# Patient Record
Sex: FEMALE
[De-identification: names, ages, dates, MRNs, and addresses within clinical notes are randomized per-mention and may not be internally consistent; named-entity substitution may affect disease eponyms.]

---

## 2020-11-11 ENCOUNTER — APPOINTMENT (OUTPATIENT)
Dept: INTERNAL MEDICINE | Facility: CLINIC | Age: 40
End: 2020-11-11

## 2022-05-09 ENCOUNTER — NON-APPOINTMENT (OUTPATIENT)
Age: 42
End: 2022-05-09

## 2022-05-09 ENCOUNTER — APPOINTMENT (OUTPATIENT)
Dept: INTERNAL MEDICINE | Facility: CLINIC | Age: 42
End: 2022-05-09
Payer: COMMERCIAL

## 2022-05-09 VITALS
HEIGHT: 69 IN | OXYGEN SATURATION: 100 % | BODY MASS INDEX: 35.84 KG/M2 | WEIGHT: 242 LBS | TEMPERATURE: 98.3 F | RESPIRATION RATE: 18 BRPM | HEART RATE: 102 BPM

## 2022-05-09 DIAGNOSIS — Z00.00 ENCOUNTER FOR GENERAL ADULT MEDICAL EXAMINATION W/OUT ABNORMAL FINDINGS: ICD-10-CM

## 2022-05-09 DIAGNOSIS — K64.9 UNSPECIFIED HEMORRHOIDS: ICD-10-CM

## 2022-05-09 DIAGNOSIS — Z78.9 OTHER SPECIFIED HEALTH STATUS: ICD-10-CM

## 2022-05-09 DIAGNOSIS — Z82.49 FAMILY HISTORY OF ISCHEMIC HEART DISEASE AND OTHER DISEASES OF THE CIRCULATORY SYSTEM: ICD-10-CM

## 2022-05-09 PROCEDURE — 99204 OFFICE O/P NEW MOD 45 MIN: CPT

## 2022-05-09 RX ORDER — HYDROCORTISONE ACETATE 25 MG/1
25 SUPPOSITORY RECTAL
Qty: 5 | Refills: 0 | Status: ACTIVE | COMMUNITY
Start: 2022-05-09 | End: 1900-01-01

## 2022-06-09 ENCOUNTER — APPOINTMENT (OUTPATIENT)
Dept: INTERNAL MEDICINE | Facility: CLINIC | Age: 42
End: 2022-06-09
Payer: COMMERCIAL

## 2022-06-09 PROCEDURE — 99214 OFFICE O/P EST MOD 30 MIN: CPT | Mod: 95

## 2022-06-09 RX ORDER — LOSARTAN POTASSIUM 50 MG/1
50 TABLET, FILM COATED ORAL
Qty: 90 | Refills: 3 | Status: DISCONTINUED | COMMUNITY
Start: 2022-05-09 | End: 2022-06-09

## 2022-11-02 ENCOUNTER — RESULT REVIEW (OUTPATIENT)
Age: 42
End: 2022-11-02

## 2023-01-26 ENCOUNTER — APPOINTMENT (OUTPATIENT)
Dept: INTERNAL MEDICINE | Facility: CLINIC | Age: 43
End: 2023-01-26

## 2023-02-13 ENCOUNTER — APPOINTMENT (OUTPATIENT)
Dept: INTERNAL MEDICINE | Facility: CLINIC | Age: 43
End: 2023-02-13
Payer: COMMERCIAL

## 2023-02-13 VITALS
SYSTOLIC BLOOD PRESSURE: 135 MMHG | DIASTOLIC BLOOD PRESSURE: 87 MMHG | HEIGHT: 69 IN | HEART RATE: 68 BPM | BODY MASS INDEX: 38.21 KG/M2 | TEMPERATURE: 97.3 F | OXYGEN SATURATION: 100 % | RESPIRATION RATE: 18 BRPM | WEIGHT: 258 LBS

## 2023-02-13 DIAGNOSIS — Z01.818 ENCOUNTER FOR OTHER PREPROCEDURAL EXAMINATION: ICD-10-CM

## 2023-02-13 PROCEDURE — 99214 OFFICE O/P EST MOD 30 MIN: CPT

## 2023-07-11 ENCOUNTER — APPOINTMENT (OUTPATIENT)
Dept: INTERNAL MEDICINE | Facility: CLINIC | Age: 43
End: 2023-07-11
Payer: COMMERCIAL

## 2023-07-11 ENCOUNTER — NON-APPOINTMENT (OUTPATIENT)
Age: 43
End: 2023-07-11

## 2023-07-11 VITALS
SYSTOLIC BLOOD PRESSURE: 132 MMHG | TEMPERATURE: 97.6 F | DIASTOLIC BLOOD PRESSURE: 88 MMHG | RESPIRATION RATE: 17 BRPM | HEIGHT: 69 IN | HEART RATE: 78 BPM | OXYGEN SATURATION: 99 % | BODY MASS INDEX: 39.25 KG/M2 | WEIGHT: 265 LBS

## 2023-07-11 DIAGNOSIS — Z00.00 ENCOUNTER FOR GENERAL ADULT MEDICAL EXAMINATION W/OUT ABNORMAL FINDINGS: ICD-10-CM

## 2023-07-11 PROCEDURE — 99396 PREV VISIT EST AGE 40-64: CPT

## 2023-07-11 RX ORDER — LOSARTAN POTASSIUM AND HYDROCHLOROTHIAZIDE 12.5; 1 MG/1; MG/1
100-12.5 TABLET ORAL DAILY
Qty: 90 | Refills: 3 | Status: ACTIVE | COMMUNITY
Start: 2022-06-09 | End: 1900-01-01

## 2023-07-11 RX ORDER — CLOBETASOL PROPIONATE 0.5 MG/G
0.05 CREAM TOPICAL TWICE DAILY
Qty: 1 | Refills: 0 | Status: ACTIVE | COMMUNITY
Start: 2023-07-11 | End: 1900-01-01

## 2023-07-11 RX ORDER — ESCITALOPRAM OXALATE 10 MG/1
10 TABLET ORAL
Qty: 90 | Refills: 3 | Status: ACTIVE | COMMUNITY
Start: 2022-05-09 | End: 1900-01-01

## 2023-07-12 LAB
25(OH)D3 SERPL-MCNC: 32.6 NG/ML
ALBUMIN SERPL ELPH-MCNC: 4.9 G/DL
ALP BLD-CCNC: 69 U/L
ALT SERPL-CCNC: 19 U/L
ANION GAP SERPL CALC-SCNC: 12 MMOL/L
AST SERPL-CCNC: 20 U/L
BILIRUB SERPL-MCNC: 0.5 MG/DL
BUN SERPL-MCNC: 13 MG/DL
CALCIUM SERPL-MCNC: 10 MG/DL
CHLORIDE SERPL-SCNC: 101 MMOL/L
CHOLEST SERPL-MCNC: 218 MG/DL
CO2 SERPL-SCNC: 26 MMOL/L
CREAT SERPL-MCNC: 0.79 MG/DL
EGFR: 96 ML/MIN/1.73M2
ESTIMATED AVERAGE GLUCOSE: 117 MG/DL
GLUCOSE SERPL-MCNC: 87 MG/DL
HBA1C MFR BLD HPLC: 5.7 %
HDLC SERPL-MCNC: 60 MG/DL
LDLC SERPL CALC-MCNC: 137 MG/DL
NONHDLC SERPL-MCNC: 158 MG/DL
POTASSIUM SERPL-SCNC: 4 MMOL/L
PROT SERPL-MCNC: 7.7 G/DL
SODIUM SERPL-SCNC: 139 MMOL/L
TRIGL SERPL-MCNC: 121 MG/DL
TSH SERPL-ACNC: 0.63 UIU/ML

## 2023-07-14 ENCOUNTER — TRANSCRIPTION ENCOUNTER (OUTPATIENT)
Age: 43
End: 2023-07-14

## 2023-08-16 ENCOUNTER — APPOINTMENT (OUTPATIENT)
Dept: BARIATRICS/WEIGHT MGMT | Facility: CLINIC | Age: 43
End: 2023-08-16
Payer: COMMERCIAL

## 2023-08-16 VITALS
HEART RATE: 82 BPM | OXYGEN SATURATION: 99 % | BODY MASS INDEX: 39.7 KG/M2 | WEIGHT: 268.06 LBS | SYSTOLIC BLOOD PRESSURE: 129 MMHG | TEMPERATURE: 97.7 F | DIASTOLIC BLOOD PRESSURE: 84 MMHG | HEIGHT: 68.9 IN

## 2023-08-16 DIAGNOSIS — L30.9 DERMATITIS, UNSPECIFIED: ICD-10-CM

## 2023-08-16 DIAGNOSIS — F41.9 ANXIETY DISORDER, UNSPECIFIED: ICD-10-CM

## 2023-08-16 DIAGNOSIS — I10 ESSENTIAL (PRIMARY) HYPERTENSION: ICD-10-CM

## 2023-08-16 DIAGNOSIS — R73.03 PREDIABETES.: ICD-10-CM

## 2023-08-16 DIAGNOSIS — F32.A ANXIETY DISORDER, UNSPECIFIED: ICD-10-CM

## 2023-08-16 DIAGNOSIS — E66.9 OBESITY, UNSPECIFIED: ICD-10-CM

## 2023-08-16 PROCEDURE — G0447 BEHAVIOR COUNSEL OBESITY 15M: CPT | Mod: 59

## 2023-08-16 PROCEDURE — 99205 OFFICE O/P NEW HI 60 MIN: CPT

## 2023-08-16 NOTE — HISTORY OF PRESENT ILLNESS
[Improved Health] : Improved health [Improved Looks/Aesthetics] : Improved looks/aesthetics [Teens] : teens [Cut/Track Calories] : cut/track calories [Low Carb Diet (Atkins/Keto)] : low carb diet (Atkins/Keto) [Other: ____] : [unfilled] [9] : 9 [I usually sleep 4-6 hours] : I usually sleep 4-6 hours [I snore] : I snore [2+ miles] : Walking distance capability: 2+ miles [0] : 0 [None] : none [FreeTextEntry2] : 258 [FreeTextEntry3] : 270 [] : No [FreeTextEntry1] : 43 year old female with pre-dm, HTN, anxiety/depression and eczema presents for evaluation for inability to lose weight. She reports that she has struggled with her weight since she was a teen. She was always athletic and participated in sports throughout her life. She reports that she has tried many different diets and exercise but has not been able to lose a significant amount amount of weight. She is frustrated that despite trying to follow a healthy lifestyle she is unable to lose weight and instead is gaining weight. She does admit that there are days where she does not eat all day and then eats in the evening. She tries to cook all the food and avoids take out. She swims in the pool twice a week for about 35 min. She does only get 5.5-6 hours of sleep per night.  Of note, she went through an early menopause which seems to run in her family. She then suddenly had a cycle after over a year of not having one. GYN is unclear about etiology.  Recently diagnosed with Pre-DM. Has h/o kidney stones  24 hr intake B: coffee cream,  greek low fat yogurt with granola with banana L: salad no protein D: protein, grilled veggies snack: mini oreo bag, mini bag of chips, nuts drinks: water, coffee, alcohol is occasional.

## 2023-08-16 NOTE — ASSESSMENT
[FreeTextEntry1] : Patient with multiple obesity related comorbidities. We discussed the importance of weight loss in the management of her comorbidities. We discussed the risks of continued excess weight on long term health. I have recommended she f/u with the RD for additional evaluation. We discussed ways she can incorporate exercise into her daily routine. Discussed 7 minute workout krsita. Explained that consistent exercise is necessary. At least 15 minutes was spent during the visit on obesity counseling.  Explained to her that the inability to lose weight may be related to underlying factors. She will complete labs to r/o secondary causes of weight gain. Pending labs will discuss the addition of AOM. She will call her ins to check for coverage. Discussed intragastric balloon and ESG as alternatives to AOM if she does not have coverage.   Emotional support provided. All of her questions were answered. Patient to complete labs, will call with the results.   f/u with RD in 2-3 weeks f/u with me in 2 months or sooner pending tx plan

## 2023-08-16 NOTE — REVIEW OF SYSTEMS
[Patient Intake Form Reviewed] : Patient intake form was reviewed [Negative] : Heme/Lymph [MED-ROS-Cardiovas-FT] : HTN [MED-ROS-Allmayur-FT] : shellfish and pnc [MED-ROS-Genituro-FT] : h/o kidney stones [MED-ROS-Dhaval-FT] : b/l knee pain [MED-ROS-Integum-FT] : eczema [MED-ROS-Psych-FT] : anxiety

## 2024-08-19 ENCOUNTER — APPOINTMENT (OUTPATIENT)
Dept: INTERNAL MEDICINE | Facility: CLINIC | Age: 44
End: 2024-08-19
Payer: COMMERCIAL

## 2024-08-19 ENCOUNTER — NON-APPOINTMENT (OUTPATIENT)
Age: 44
End: 2024-08-19

## 2024-08-19 VITALS
HEART RATE: 70 BPM | OXYGEN SATURATION: 98 % | HEIGHT: 69 IN | DIASTOLIC BLOOD PRESSURE: 82 MMHG | SYSTOLIC BLOOD PRESSURE: 135 MMHG | WEIGHT: 272 LBS | BODY MASS INDEX: 40.29 KG/M2 | TEMPERATURE: 98 F | RESPIRATION RATE: 17 BRPM

## 2024-08-19 DIAGNOSIS — E28.39 OTHER PRIMARY OVARIAN FAILURE: ICD-10-CM

## 2024-08-19 DIAGNOSIS — E66.9 OBESITY, UNSPECIFIED: ICD-10-CM

## 2024-08-19 DIAGNOSIS — I10 ESSENTIAL (PRIMARY) HYPERTENSION: ICD-10-CM

## 2024-08-19 DIAGNOSIS — R73.03 PREDIABETES.: ICD-10-CM

## 2024-08-19 DIAGNOSIS — Z00.00 ENCOUNTER FOR GENERAL ADULT MEDICAL EXAMINATION W/OUT ABNORMAL FINDINGS: ICD-10-CM

## 2024-08-19 PROCEDURE — 99214 OFFICE O/P EST MOD 30 MIN: CPT | Mod: 25

## 2024-08-19 PROCEDURE — 99396 PREV VISIT EST AGE 40-64: CPT

## 2024-08-19 RX ORDER — SEMAGLUTIDE 0.25 MG/.5ML
0.25 INJECTION, SOLUTION SUBCUTANEOUS
Qty: 1 | Refills: 1 | Status: ACTIVE | COMMUNITY
Start: 2024-08-19 | End: 1900-01-01

## 2024-08-27 LAB
ALBUMIN SERPL ELPH-MCNC: 4.7 G/DL
ALP BLD-CCNC: 79 U/L
ALT SERPL-CCNC: 21 U/L
ANION GAP SERPL CALC-SCNC: 15 MMOL/L
APPEARANCE: CLEAR
AST SERPL-CCNC: 19 U/L
BILIRUB SERPL-MCNC: 0.5 MG/DL
BILIRUBIN URINE: NEGATIVE
BLOOD URINE: NEGATIVE
BUN SERPL-MCNC: 13 MG/DL
CALCIUM SERPL-MCNC: 10.1 MG/DL
CHLORIDE SERPL-SCNC: 102 MMOL/L
CHOLEST SERPL-MCNC: 207 MG/DL
CO2 SERPL-SCNC: 25 MMOL/L
COLOR: YELLOW
CREAT SERPL-MCNC: 0.77 MG/DL
EGFR: 98 ML/MIN/1.73M2
ESTIMATED AVERAGE GLUCOSE: 123 MG/DL
ESTROGEN SERPL-MCNC: 57 PG/ML
FSH SERPL-MCNC: 40.3 IU/L
GLUCOSE QUALITATIVE U: NEGATIVE MG/DL
GLUCOSE SERPL-MCNC: 103 MG/DL
HBA1C MFR BLD HPLC: 5.9 %
HCT VFR BLD CALC: 42.6 %
HDLC SERPL-MCNC: 55 MG/DL
HGB BLD-MCNC: 14 G/DL
KETONES URINE: NEGATIVE MG/DL
LDLC SERPL CALC-MCNC: 127 MG/DL
LEUKOCYTE ESTERASE URINE: NEGATIVE
MCHC RBC-ENTMCNC: 29.7 PG
MCHC RBC-ENTMCNC: 32.9 GM/DL
MCV RBC AUTO: 90.3 FL
NITRITE URINE: NEGATIVE
NONHDLC SERPL-MCNC: 151 MG/DL
PH URINE: 6
PLATELET # BLD AUTO: 298 K/UL
POTASSIUM SERPL-SCNC: 4.5 MMOL/L
PROGEST SERPL-MCNC: 0.1 NG/ML
PROT SERPL-MCNC: 7.2 G/DL
PROTEIN URINE: NEGATIVE MG/DL
RBC # BLD: 4.72 M/UL
RBC # FLD: 13.2 %
SODIUM SERPL-SCNC: 141 MMOL/L
SPECIFIC GRAVITY URINE: 1.02
TRIGL SERPL-MCNC: 135 MG/DL
TSH SERPL-ACNC: 0.8 UIU/ML
UROBILINOGEN URINE: 0.2 MG/DL
WBC # FLD AUTO: 4.66 K/UL

## 2024-11-19 ENCOUNTER — TRANSCRIPTION ENCOUNTER (OUTPATIENT)
Age: 44
End: 2024-11-19

## 2024-12-06 ENCOUNTER — NON-APPOINTMENT (OUTPATIENT)
Age: 44
End: 2024-12-06

## 2024-12-12 ENCOUNTER — TRANSCRIPTION ENCOUNTER (OUTPATIENT)
Age: 44
End: 2024-12-12

## 2025-01-13 ENCOUNTER — TRANSCRIPTION ENCOUNTER (OUTPATIENT)
Age: 45
End: 2025-01-13

## 2025-01-28 ENCOUNTER — NON-APPOINTMENT (OUTPATIENT)
Age: 45
End: 2025-01-28

## 2025-04-29 ENCOUNTER — NON-APPOINTMENT (OUTPATIENT)
Age: 45
End: 2025-04-29